# Patient Record
Sex: FEMALE | Race: WHITE | NOT HISPANIC OR LATINO | URBAN - METROPOLITAN AREA
[De-identification: names, ages, dates, MRNs, and addresses within clinical notes are randomized per-mention and may not be internally consistent; named-entity substitution may affect disease eponyms.]

---

## 2018-09-24 ENCOUNTER — OFFICE (OUTPATIENT)
Dept: URBAN - METROPOLITAN AREA CLINIC 11 | Facility: CLINIC | Age: 68
End: 2018-09-24

## 2018-09-24 VITALS
WEIGHT: 140 LBS | DIASTOLIC BLOOD PRESSURE: 67 MMHG | HEART RATE: 63 BPM | SYSTOLIC BLOOD PRESSURE: 122 MMHG | HEIGHT: 65 IN

## 2018-09-24 DIAGNOSIS — K21.9 GASTRO-ESOPHAGEAL REFLUX DISEASE WITHOUT ESOPHAGITIS: ICD-10-CM

## 2018-09-24 DIAGNOSIS — K59.00 CONSTIPATION, UNSPECIFIED: ICD-10-CM

## 2018-09-24 DIAGNOSIS — D3A.00 BENIGN CARCINOID TUMOR OF UNSPECIFIED SITE: ICD-10-CM

## 2018-09-24 DIAGNOSIS — R14.0 ABDOMINAL DISTENSION (GASEOUS): ICD-10-CM

## 2018-09-24 DIAGNOSIS — I48.91 UNSPECIFIED ATRIAL FIBRILLATION: ICD-10-CM

## 2018-09-24 DIAGNOSIS — Z79.01 LONG TERM (CURRENT) USE OF ANTICOAGULANTS: ICD-10-CM

## 2018-09-24 DIAGNOSIS — D38.1 NEOPLASM OF UNCERTAIN BEHAVIOR OF TRACHEA, BRONCHUS AND LUNG: ICD-10-CM

## 2018-09-24 PROCEDURE — 99204 OFFICE O/P NEW MOD 45 MIN: CPT | Performed by: INTERNAL MEDICINE

## 2018-09-24 RX ORDER — SODIUM PICOSULFATE, MAGNESIUM OXIDE, AND ANHYDROUS CITRIC ACID 10; 3.5; 12 MG/160ML; G/160ML; G/160ML
LIQUID ORAL
Qty: 1 | Refills: 0 | Status: COMPLETED
Start: 2018-09-24 | End: 2018-11-05

## 2018-09-24 NOTE — SERVICENOTES
We will go ahead and schedule the patient for colonoscopy and because of her history of carcinoid and mild upper abdominal symptoms I do think doing EGD at the same time would be reasonable, especially as she will be off of anticoagulation at that time and stable from cardiac standpoint.  We will need cardiac clearance prior to proceeding with the procedure. I did have her evaluated by one of our lead CRNAs regarding candidacy for procedure in the surgery center given her cardiac history and history of lung resection and it appears that she would be a candidate once we have cardiac clearance.  The patient does apparently get surveillance imaging by her oncology team in [place] and states she is due for her next MRI of the liver/pancreas in the next few weeks.  She apparently had a negative octreotide scan recently as well. Her constipation appears to be improved.  Her ER visit few weeks ago could be related to either new medication or gastroenteritis.  I did recommend she start taking fiber supplementation and stool softeners or MiraLax as needed especially when she travels.  She should otherwise notify us if she has any new or worsening symptoms in the meantime.

## 2018-09-24 NOTE — SERVICEHPINOTES
Ms. Bhatti is a 68-year-old female here for evaluation for colonoscopy. The patient states that her last colonoscopy was around nine or 10 years ago with no polyps removed that she is aware of. She denies any first-degree family history of colon cancer colon polyps with does have a family history of colon cancer in her grandfather. 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 She then did well up until 2009 when she was found to have recurrence of some disease in the liver and so underwent partial resection in [facility] by Dr. Yasir Shine. She apparently has done well since then up until recently when she underwent another bronchoscopy which revealed a small nodule at the stump of her prior left resection which was found to be carcinoid and so underwent ablation. During this time she also struggles with new onset atrial fibrillation and has undergone cardio ablation 3 times. She is currently in sinus rhythm. She is also on blood thinners. She denies any issues with chest pain or significant shortness of breath. She had recent octreotide scan which was reportedly negative. She denies any issues with upper abdominal pain, nausea, or vomiting. She does have some occasional reflux for which she takes over-the-counter medications as needed. She also has some occasional bloating. The patient did have to go to the hospital because of some acute abdominal pain in August 2018 at which time she had CT scan with contrast which revealed normal pancreas. There was gas and fecal material noted throughout the small bowel but was otherwise negative. Patient was given a laxative which she states worked very well for her and since then she has not had any issues. She denies any return of constipation or other changes in medications. Outside of being put on sotalol.

## 2018-11-05 ENCOUNTER — AMBULATORY SURGICAL CENTER (OUTPATIENT)
Dept: URBAN - METROPOLITAN AREA SURGERY 3 | Facility: SURGERY | Age: 68
End: 2018-11-05
Payer: COMMERCIAL

## 2018-11-05 ENCOUNTER — OFFICE (OUTPATIENT)
Dept: URBAN - METROPOLITAN AREA PATHOLOGY 22 | Facility: PATHOLOGY | Age: 68
End: 2018-11-05
Payer: COMMERCIAL

## 2018-11-05 ENCOUNTER — AMBULATORY SURGICAL CENTER (OUTPATIENT)
Dept: URBAN - METROPOLITAN AREA SURGERY 3 | Facility: SURGERY | Age: 68
End: 2018-11-05

## 2018-11-05 VITALS
HEART RATE: 56 BPM | SYSTOLIC BLOOD PRESSURE: 109 MMHG | DIASTOLIC BLOOD PRESSURE: 48 MMHG | SYSTOLIC BLOOD PRESSURE: 103 MMHG | SYSTOLIC BLOOD PRESSURE: 150 MMHG | OXYGEN SATURATION: 96 % | RESPIRATION RATE: 17 BRPM | OXYGEN SATURATION: 97 % | HEIGHT: 65 IN | SYSTOLIC BLOOD PRESSURE: 108 MMHG | OXYGEN SATURATION: 93 % | DIASTOLIC BLOOD PRESSURE: 53 MMHG | DIASTOLIC BLOOD PRESSURE: 96 MMHG | SYSTOLIC BLOOD PRESSURE: 113 MMHG | TEMPERATURE: 97.1 F | DIASTOLIC BLOOD PRESSURE: 96 MMHG | HEIGHT: 65 IN | RESPIRATION RATE: 16 BRPM | HEART RATE: 58 BPM | RESPIRATION RATE: 17 BRPM | RESPIRATION RATE: 18 BRPM | RESPIRATION RATE: 18 BRPM | TEMPERATURE: 97.1 F | DIASTOLIC BLOOD PRESSURE: 56 MMHG | DIASTOLIC BLOOD PRESSURE: 48 MMHG | HEART RATE: 58 BPM | WEIGHT: 135 LBS | SYSTOLIC BLOOD PRESSURE: 108 MMHG | HEART RATE: 68 BPM | OXYGEN SATURATION: 96 % | HEART RATE: 56 BPM | TEMPERATURE: 97.6 F | HEART RATE: 68 BPM | DIASTOLIC BLOOD PRESSURE: 51 MMHG | TEMPERATURE: 97.6 F | OXYGEN SATURATION: 97 % | RESPIRATION RATE: 20 BRPM | OXYGEN SATURATION: 93 % | SYSTOLIC BLOOD PRESSURE: 113 MMHG | RESPIRATION RATE: 16 BRPM | WEIGHT: 135 LBS | DIASTOLIC BLOOD PRESSURE: 56 MMHG | RESPIRATION RATE: 20 BRPM | SYSTOLIC BLOOD PRESSURE: 150 MMHG | SYSTOLIC BLOOD PRESSURE: 109 MMHG | DIASTOLIC BLOOD PRESSURE: 53 MMHG | SYSTOLIC BLOOD PRESSURE: 103 MMHG | DIASTOLIC BLOOD PRESSURE: 51 MMHG

## 2018-11-05 DIAGNOSIS — R14.0 ABDOMINAL DISTENSION (GASEOUS): ICD-10-CM

## 2018-11-05 DIAGNOSIS — K31.89 OTHER DISEASES OF STOMACH AND DUODENUM: ICD-10-CM

## 2018-11-05 DIAGNOSIS — B37.81 CANDIDAL ESOPHAGITIS: ICD-10-CM

## 2018-11-05 DIAGNOSIS — D12.2 BENIGN NEOPLASM OF ASCENDING COLON: ICD-10-CM

## 2018-11-05 DIAGNOSIS — Z12.11 ENCOUNTER FOR SCREENING FOR MALIGNANT NEOPLASM OF COLON: ICD-10-CM

## 2018-11-05 DIAGNOSIS — K63.5 POLYP OF COLON: ICD-10-CM

## 2018-11-05 DIAGNOSIS — K21.9 GASTRO-ESOPHAGEAL REFLUX DISEASE WITHOUT ESOPHAGITIS: ICD-10-CM

## 2018-11-05 DIAGNOSIS — K22.2 ESOPHAGEAL OBSTRUCTION: ICD-10-CM

## 2018-11-05 DIAGNOSIS — K64.1 SECOND DEGREE HEMORRHOIDS: ICD-10-CM

## 2018-11-05 DIAGNOSIS — K57.30 DIVERTICULOSIS OF LARGE INTESTINE WITHOUT PERFORATION OR ABS: ICD-10-CM

## 2018-11-05 DIAGNOSIS — K44.9 DIAPHRAGMATIC HERNIA WITHOUT OBSTRUCTION OR GANGRENE: ICD-10-CM

## 2018-11-05 PROBLEM — D12.4 BENIGN NEOPLASM OF DESCENDING COLON: Status: ACTIVE | Noted: 2018-11-05

## 2018-11-05 PROCEDURE — 45385 COLONOSCOPY W/LESION REMOVAL: CPT | Mod: PT | Performed by: INTERNAL MEDICINE

## 2018-11-05 PROCEDURE — 45380 COLONOSCOPY AND BIOPSY: CPT | Mod: 59,PT | Performed by: INTERNAL MEDICINE

## 2018-11-05 PROCEDURE — 45380 COLONOSCOPY AND BIOPSY: CPT | Mod: PT,59 | Performed by: INTERNAL MEDICINE

## 2018-11-05 PROCEDURE — G8907 PT DOC NO EVENTS ON DISCHARG: HCPCS | Performed by: INTERNAL MEDICINE

## 2018-11-05 PROCEDURE — 43239 EGD BIOPSY SINGLE/MULTIPLE: CPT | Mod: 51 | Performed by: INTERNAL MEDICINE

## 2018-11-05 PROCEDURE — G8918 PT W/O PREOP ORDER IV AB PRO: HCPCS | Performed by: INTERNAL MEDICINE

## 2018-11-05 PROCEDURE — 88313 SPECIAL STAINS GROUP 2: CPT | Performed by: INTERNAL MEDICINE

## 2018-11-05 PROCEDURE — 88342 IMHCHEM/IMCYTCHM 1ST ANTB: CPT | Performed by: INTERNAL MEDICINE

## 2018-11-05 PROCEDURE — 88305 TISSUE EXAM BY PATHOLOGIST: CPT | Performed by: INTERNAL MEDICINE

## 2018-11-05 PROCEDURE — 45380 COLONOSCOPY AND BIOPSY: CPT | Mod: 59 | Performed by: INTERNAL MEDICINE

## 2018-11-05 RX ORDER — FLUCONAZOLE 100 MG/1
TABLET ORAL
Qty: 15 | Refills: 0 | Status: COMPLETED
Start: 2018-11-05 | End: 2020-11-16

## 2018-11-05 NOTE — SERVICEHPINOTES
Ms. Bhatti is a 68-year-old female here for evaluation for colonoscopy. The patient states that her last colonoscopy was around nine or 10 years ago with no polyps removed that she is aware of. She denies any first-degree family history of colon cancer colon polyps with does have a family history of colon cancer in her grandfather. 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 She then did well up until 2009 when she was found to have recurrence of some disease in the liver and so underwent partial resection in [facility] by Dr. Yasir Shine. She apparently has done well since then up until recently when she underwent another bronchoscopy which revealed a small nodule at the stump of her prior left resection which was found to be carcinoid and so underwent ablation. During this time she also struggles with new onset atrial fibrillation and has undergone cardio ablation 3 times. She is currently in sinus rhythm. She is also on blood thinners. She denies any issues with chest pain or significant shortness of breath. She had recent octreotide scan which was reportedly negative. She denies any issues with upper abdominal pain, nausea, or vomiting. She does have some occasional reflux for which she takes over-the-counter medications as needed. She also has some occasional bloating. The patient did have to go to the hospital because of some acute abdominal pain in August 2018 at which time she had CT scan with contrast which revealed normal pancreas. There was gas and fecal material noted throughout the small bowel but was otherwise negative. Patient was given a laxative which she states worked very well for her and since then she has not had any issues. She denies any return of constipation or other changes in medications. Outside of being put on sotalol.

## 2018-11-05 NOTE — SERVICENOTES
Discussed with her cardiologist, Dr. Jiménez.  The patient is to stay off of Eliquis until fluconazole completed as she is in sinus rhythm.  Due to possible QTc prolongation she recommends continue sotalol at current dose and pt is to come to her office on Wednesday for EKG.

## 2019-06-24 ENCOUNTER — OFFICE (OUTPATIENT)
Dept: URBAN - METROPOLITAN AREA CLINIC 11 | Facility: CLINIC | Age: 69
End: 2019-06-24

## 2019-06-24 VITALS
SYSTOLIC BLOOD PRESSURE: 138 MMHG | DIASTOLIC BLOOD PRESSURE: 81 MMHG | HEIGHT: 65 IN | HEART RATE: 65 BPM | WEIGHT: 146 LBS

## 2019-06-24 DIAGNOSIS — Z79.01 LONG TERM (CURRENT) USE OF ANTICOAGULANTS: ICD-10-CM

## 2019-06-24 DIAGNOSIS — R10.12 LEFT UPPER QUADRANT PAIN: ICD-10-CM

## 2019-06-24 DIAGNOSIS — I48.91 UNSPECIFIED ATRIAL FIBRILLATION: ICD-10-CM

## 2019-06-24 DIAGNOSIS — K21.9 GASTRO-ESOPHAGEAL REFLUX DISEASE WITHOUT ESOPHAGITIS: ICD-10-CM

## 2019-06-24 DIAGNOSIS — K59.00 CONSTIPATION, UNSPECIFIED: ICD-10-CM

## 2019-06-24 DIAGNOSIS — R14.0 ABDOMINAL DISTENSION (GASEOUS): ICD-10-CM

## 2019-06-24 DIAGNOSIS — D38.1 NEOPLASM OF UNCERTAIN BEHAVIOR OF TRACHEA, BRONCHUS AND LUNG: ICD-10-CM

## 2019-06-24 DIAGNOSIS — D3A.00 BENIGN CARCINOID TUMOR OF UNSPECIFIED SITE: ICD-10-CM

## 2019-06-24 LAB
CREATININE: 0.76 MG/DL (ref 0.57–1)
CREATININE: EGFR IF AFRICN AM: 93 ML/MIN/1.73 (ref 59–?)
CREATININE: EGFR IF NONAFRICN AM: 81 ML/MIN/1.73 (ref 59–?)

## 2019-06-24 PROCEDURE — 99214 OFFICE O/P EST MOD 30 MIN: CPT | Performed by: INTERNAL MEDICINE

## 2019-06-24 RX ORDER — DICYCLOMINE HYDROCHLORIDE 20 MG/1
TABLET ORAL
Qty: 90 | Refills: 6 | Status: CANCELLED
Start: 2018-10-15 | End: 2021-03-18

## 2019-06-24 NOTE — SERVICENOTES
Given the patient's new onset of symptoms I do think abdominal imaging is reasonable.  This will allow us to evaluate for any neoplasm, inflammation, or significantly large stool burden.  In the meantime I do want to be more aggressive in treating her constipation so I did recommend that she take fiber on a daily basis as well as MiraLax daily.  We did give her some samples of Trulance to take if MiraLax does not work.  I did offer for her to take Trulance up from but she wanted to try MiraLax first.  If her symptoms do not improve but have improvement of bowel habits and we can also consider trial of a PPI.  In the meantime I will go ahead and refill her Bentyl as well. She should otherwise notify us if symptoms worsen or do not improve with improvement of bowel habits.

## 2019-06-24 NOTE — SERVICEHPINOTES
Ms. Bhatti is a 69-year-old female here for evaluation of constipation and new LUQ pain. 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 She then did well up until 2009 when she was found to have recurrence of some disease in the liver and so underwent partial resection in [facility] by Dr. Yasir Shine. She apparently has done well since then up until recently when she underwent another bronchoscopy which revealed a small nodule at the stump of her prior left resection which was found to be carcinoid and so underwent ablation. During this time she also struggled with new onset atrial fibrillation and has undergone cardiac ablation 3 times. She is also on blood thinners. The patient did have to go to the hospital because of some acute abdominal pain in August 2018 at which time she had CT scan with contrast which revealed normal pancreas. There was gas and fecal material noted throughout the small bowel but was otherwise negative. The patient was given a laxative which she states worked very well for her.Interim History:Psychiatric initially saw me in September 2018 and at that time we had undergo EGD and colonoscopy. EGD did reveal a diminutive Schatzki ring as well as some grade to Candida esophagitis and a 4 cm medium hiatal hernia. There was some patchy gastric erythema with biopsies only with benign reactive gastropathy. Esophageal biopsies were with some mild reactive changes as well. Colonoscopy revealed some mild diverticulosis but was otherwise normal to the terminal ileum. There was an adenomatous polyp removed in her next colonoscopy is due in 2023. She was treated with fluconazole for Candida esophagitis. She now comes in today stating that overall she has been doing relatively well. She does have some occasional abdominal pain which is improved with dicyclomine. She has been having some more constipation recently but denies taking anything for it. She will occasionally take a stool softener. Her bowel movements have been smaller. What really by her here was more pain under her left rib cage. She states that this started a few weeks ago and will come go on its own. She does not know of any relation to bowel habits or food that she is aware of. She will occasionally get a similar pain on the right side of the ribcage but it is mainly on the left. It does not radiate. Nothing really makes it better or worse. Though she does have some mild reflux she does not take anything on a regular basis for this.

## 2019-07-03 ENCOUNTER — OFFICE (OUTPATIENT)
Dept: URBAN - METROPOLITAN AREA CLINIC 22 | Facility: CLINIC | Age: 69
End: 2019-07-03

## 2019-07-03 DIAGNOSIS — R10.12 LEFT UPPER QUADRANT PAIN: ICD-10-CM

## 2019-07-03 DIAGNOSIS — R14.0 ABDOMINAL DISTENSION (GASEOUS): ICD-10-CM

## 2019-07-03 PROCEDURE — 74177 CT ABD & PELVIS W/CONTRAST: CPT | Mod: TC | Performed by: INTERNAL MEDICINE

## 2019-09-30 ENCOUNTER — OFFICE (OUTPATIENT)
Dept: URBAN - METROPOLITAN AREA CLINIC 11 | Facility: CLINIC | Age: 69
End: 2019-09-30

## 2019-09-30 VITALS
SYSTOLIC BLOOD PRESSURE: 131 MMHG | HEIGHT: 65 IN | HEART RATE: 58 BPM | WEIGHT: 147 LBS | DIASTOLIC BLOOD PRESSURE: 86 MMHG

## 2019-09-30 DIAGNOSIS — K59.00 CONSTIPATION, UNSPECIFIED: ICD-10-CM

## 2019-09-30 DIAGNOSIS — I48.91 UNSPECIFIED ATRIAL FIBRILLATION: ICD-10-CM

## 2019-09-30 DIAGNOSIS — D38.1 NEOPLASM OF UNCERTAIN BEHAVIOR OF TRACHEA, BRONCHUS AND LUNG: ICD-10-CM

## 2019-09-30 DIAGNOSIS — Z79.01 LONG TERM (CURRENT) USE OF ANTICOAGULANTS: ICD-10-CM

## 2019-09-30 DIAGNOSIS — K21.9 GASTRO-ESOPHAGEAL REFLUX DISEASE WITHOUT ESOPHAGITIS: ICD-10-CM

## 2019-09-30 DIAGNOSIS — D3A.00 BENIGN CARCINOID TUMOR OF UNSPECIFIED SITE: ICD-10-CM

## 2019-09-30 DIAGNOSIS — R14.0 ABDOMINAL DISTENSION (GASEOUS): ICD-10-CM

## 2019-09-30 PROCEDURE — 99213 OFFICE O/P EST LOW 20 MIN: CPT | Performed by: INTERNAL MEDICINE

## 2019-09-30 NOTE — SERVICENOTES
Overall the patient does appear to be doing better.  Her bowel habits have improved with fiber and MiraLax.  I did offer a trial of Linzess or Amitza but the patient states that she was not very interested at this time.  While her symptoms at night could be related to her “bronchitis” it could also be related to sinuses or nighttime reflux issue so I did recommend that she be sure not to eat within 3 hours of going to bed, try to  sleep on her left side, and may take over-the-counter Pepcid.  She was instructed to notify us if she would like to try intake Prilosec or Nexium as well.

## 2019-09-30 NOTE — SERVICEHPINOTES
Ms. Bhatti is a 69-year-old female here for follow up. 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 She then did well up until 2009 when she was found to have recurrence of some disease in the liver and so underwent partial resection in [facility] by Dr. Yasir Shine. She apparently has done well since then up until recently when she underwent another bronchoscopy which revealed a small nodule at the stump of her prior left resection which was found to be carcinoid and so underwent ablation. During this time she also struggled with new onset atrial fibrillation and has undergone cardiac ablation 3 times. She is also on blood thinners. The patient did have to go to the hospital because of some acute abdominal pain in August 2018 at which time she had CT scan with contrast which revealed normal pancreas. There was gas and fecal material noted throughout the small bowel but was otherwise negative. The patient was given a laxative which she states worked very well for her. She initially saw me in September 2018 and at that time we had undergo EGD and colonoscopy. EGD did reveal a diminutive Schatzki ring as well as some grade to Candida esophagitis and a 4 cm medium hiatal hernia. There was some patchy gastric erythema with biopsies only with benign reactive gastropathy. Esophageal biopsies were with some mild reactive changes as well. Colonoscopy revealed some mild diverticulosis but was otherwise normal to the terminal ileum. There was an adenomatous polyp removed in her next colonoscopy is due in 2023. She was treated with fluconazole for Candida esophagitis.Interim History:Arash I last saw the patient in June 2019 she was having some issues with some left upper quadrant discomfort. We did order CT scan which was overall normal except for a small 1.4 centimeter liver lesion stable since 2017. We did also give her a trial of Trulance to help with constipation as she states her pain was better after having bowel movements. She took Trulance for week but states that she really did not notice any change. She has since been taking fiber and MiraLax on a more regular basis and states that she now has bowel movements on a daily basis which have helped. She also has been taking dicyclomine as needed for pain which she states also helps significantly. She states that she will rarely get reflux. She now notes that her pain is more in the left flank area and only happens occasionally. She denies any urinary problems or blood in her urine. She states that she does have some occasional episodes of coughing at night which she attributes to her carcinoid but does note that is usually on the right side when it happens. She does not take anything for sinus medication or any PPI.

## 2020-02-03 ENCOUNTER — INPATIENT HOSPITAL (OUTPATIENT)
Dept: URBAN - METROPOLITAN AREA HOSPITAL 130 | Facility: HOSPITAL | Age: 70
End: 2020-02-03

## 2020-02-03 DIAGNOSIS — R10.11 RIGHT UPPER QUADRANT PAIN: ICD-10-CM

## 2020-02-03 DIAGNOSIS — I82.0 BUDD-CHIARI SYNDROME: ICD-10-CM

## 2020-02-03 PROCEDURE — 99222 1ST HOSP IP/OBS MODERATE 55: CPT | Performed by: INTERNAL MEDICINE

## 2020-02-04 ENCOUNTER — INPATIENT HOSPITAL (OUTPATIENT)
Dept: URBAN - METROPOLITAN AREA HOSPITAL 130 | Facility: HOSPITAL | Age: 70
End: 2020-02-04

## 2020-02-04 DIAGNOSIS — I82.0 BUDD-CHIARI SYNDROME: ICD-10-CM

## 2020-02-04 DIAGNOSIS — R10.11 RIGHT UPPER QUADRANT PAIN: ICD-10-CM

## 2020-02-04 PROCEDURE — 99231 SBSQ HOSP IP/OBS SF/LOW 25: CPT | Performed by: INTERNAL MEDICINE

## 2020-05-18 ENCOUNTER — OFFICE (OUTPATIENT)
Dept: URBAN - METROPOLITAN AREA CLINIC 11 | Facility: CLINIC | Age: 70
End: 2020-05-18

## 2020-05-18 VITALS
DIASTOLIC BLOOD PRESSURE: 68 MMHG | SYSTOLIC BLOOD PRESSURE: 123 MMHG | WEIGHT: 148 LBS | HEART RATE: 63 BPM | HEIGHT: 65 IN

## 2020-05-18 DIAGNOSIS — K21.9 GASTRO-ESOPHAGEAL REFLUX DISEASE WITHOUT ESOPHAGITIS: ICD-10-CM

## 2020-05-18 DIAGNOSIS — D38.1 NEOPLASM OF UNCERTAIN BEHAVIOR OF TRACHEA, BRONCHUS AND LUNG: ICD-10-CM

## 2020-05-18 DIAGNOSIS — D3A.00 BENIGN CARCINOID TUMOR OF UNSPECIFIED SITE: ICD-10-CM

## 2020-05-18 DIAGNOSIS — K59.00 CONSTIPATION, UNSPECIFIED: ICD-10-CM

## 2020-05-18 DIAGNOSIS — R10.12 LEFT UPPER QUADRANT PAIN: ICD-10-CM

## 2020-05-18 PROCEDURE — 99213 OFFICE O/P EST LOW 20 MIN: CPT | Performed by: INTERNAL MEDICINE

## 2020-05-18 NOTE — SERVICENOTES
The patient was seen along with Karina Parmar NP.  I have evaluated the patient, discussed with NP, and agree with the above history, impression, and plan. She is doing well.  No further pain.  GERD and constipation better.  Abdominal exam benign.-WENDY

## 2020-05-18 NOTE — SERVICEHPINOTES
Ms. Bhatti is a 70-year-old female here for a follow-up for constipation, left upper quadrant pain, and GERD, the patient had a CT scan performed 07/03/2019 for her left upper quadrant pain which is unremarkable. Overall, the patient has been doing very well. The patient stated that she began to take been a fiber for her constipation which did seem to relieved her left upper quadrant pain. The patient states that she has regular formed bowel movements daily and is not a longer constipated. The patient also reports that her GERD symptoms have since resolved and she is no longer taking a PPI. The patient stated that she would be willing to take an H2 blocker for breakthrough reflux, but she states that she controls her reflux with her diet fairly well. The patient does report taking Bentyl for occasional abdominal cramping, but states that she will read overtly have to do this. The patient denies any abdominal pain, nausea, vomiting, melena, hematochezia, or unintentional weight loss. The patient's weight has remained stable since her last visit. The patient is due for colonoscopy recall in 2023.

## 2020-11-16 ENCOUNTER — OFFICE (OUTPATIENT)
Dept: URBAN - METROPOLITAN AREA CLINIC 11 | Facility: CLINIC | Age: 70
End: 2020-11-16

## 2020-11-16 VITALS
HEART RATE: 67 BPM | SYSTOLIC BLOOD PRESSURE: 120 MMHG | OXYGEN SATURATION: 94 % | WEIGHT: 152 LBS | DIASTOLIC BLOOD PRESSURE: 60 MMHG | HEIGHT: 65 IN

## 2020-11-16 DIAGNOSIS — D3A.00 BENIGN CARCINOID TUMOR OF UNSPECIFIED SITE: ICD-10-CM

## 2020-11-16 DIAGNOSIS — R14.0 ABDOMINAL DISTENSION (GASEOUS): ICD-10-CM

## 2020-11-16 DIAGNOSIS — K59.00 CONSTIPATION, UNSPECIFIED: ICD-10-CM

## 2020-11-16 DIAGNOSIS — R10.12 LEFT UPPER QUADRANT PAIN: ICD-10-CM

## 2020-11-16 DIAGNOSIS — D38.1 NEOPLASM OF UNCERTAIN BEHAVIOR OF TRACHEA, BRONCHUS AND LUNG: ICD-10-CM

## 2020-11-16 DIAGNOSIS — K21.9 GASTRO-ESOPHAGEAL REFLUX DISEASE WITHOUT ESOPHAGITIS: ICD-10-CM

## 2020-11-16 PROCEDURE — 99213 OFFICE O/P EST LOW 20 MIN: CPT | Performed by: INTERNAL MEDICINE

## 2021-11-15 ENCOUNTER — OFFICE (OUTPATIENT)
Dept: URBAN - METROPOLITAN AREA CLINIC 11 | Facility: CLINIC | Age: 71
End: 2021-11-15

## 2021-11-15 VITALS
HEIGHT: 65 IN | OXYGEN SATURATION: 100 % | HEART RATE: 58 BPM | WEIGHT: 135 LBS | DIASTOLIC BLOOD PRESSURE: 72 MMHG | SYSTOLIC BLOOD PRESSURE: 121 MMHG

## 2021-11-15 DIAGNOSIS — D3A.00 BENIGN CARCINOID TUMOR OF UNSPECIFIED SITE: ICD-10-CM

## 2021-11-15 DIAGNOSIS — D38.1 NEOPLASM OF UNCERTAIN BEHAVIOR OF TRACHEA, BRONCHUS AND LUNG: ICD-10-CM

## 2021-11-15 DIAGNOSIS — R10.12 LEFT UPPER QUADRANT PAIN: ICD-10-CM

## 2021-11-15 DIAGNOSIS — K59.00 CONSTIPATION, UNSPECIFIED: ICD-10-CM

## 2021-11-15 DIAGNOSIS — K21.9 GASTRO-ESOPHAGEAL REFLUX DISEASE WITHOUT ESOPHAGITIS: ICD-10-CM

## 2021-11-15 PROCEDURE — 99213 OFFICE O/P EST LOW 20 MIN: CPT

## 2021-11-15 NOTE — SERVICEHPINOTES
Ms. Bhatti is a 71 yr old female who presents today for follow up for constipation, abdominal pain and Gerd. She states that her constipation has resolved. She now will have 2-3 formed BMs a day with no straining or rectal bleeding. She also has noted that she has some occasional twinges of her left upper quadrant discomfort about once a week. This is well controlled with dicyclomine. She also knows that this is sometimes worse with spicy food. Her reflux is under very good control with dietary modification. She has lost 15 pounds since we last saw her in Nov. 2020. She admits to changing her diet and exercising more. She has a history of A. Fib and is currently taking medication for her irregular heart rhythm. 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 She was started on Octreotide in April 2021 for her liver lesions and receives injections every 4 weeks. She currently sees Dr. Levi for her oncology management. She was informed that her lesions are shrinking. She does have a history of a carcinoid tumor of the lung with history of left lung resection in 1995 and an ablation of tumor and bronchial stump in 2018 She had a colonoscopy in 2018 which revealed mild diverticulosis of the sigmoid colon, Normal mucosa in the terminal ileum and whole colon, Polyp  in the ascending colon, Polyp in the descending colon and Grade/Stage II internal hemorrhoids. Her next colonoscopy is schedule for 2023. 
br
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 She then did well up until 2009 when she was found to have recurrence of some disease in the liver and so underwent partial resection in [place] by Dr. Yasir Shine. She apparently has done well since then up until recently when she underwent another bronchoscopy which revealed a small nodule at the stump of her prior left resection which was found to be carcinoid and so underwent ablation. During this time she also struggled with new onset atrial fibrillation and has undergone cardiac ablation. The patient did have to go to the hospital because of some acute abdominal pain in August 2018 at which time she had CT scan with contrast which revealed normal pancreas. There was gas and fecal material noted throughout the small bowel but was otherwise negative. The patient was given a laxative which she states worked very well for her. She initially saw me in September 2018 and at that time we had undergo EGD and colonoscopy. EGD did reveal a diminutive Schatzki ring as well as some grade to Candida esophagitis and a 4 cm medium hiatal hernia. There was some patchy gastric erythema with biopsies only with benign reactive gastropathy. Esophageal biopsies were with some mild reactive changes as well. Colonoscopy revealed some mild diverticulosis but was otherwise normal to the terminal ileum. There was an adenomatous polyp removed in her next colonoscopy is due in 2023. She was treated with fluconazole for Candida esophagitis. In June 2019 she was having some issues with some left upper quadrant discomfort. We did order CT scan which was overall normal except for a small 1.4 centimeter liver lesion stable since 2017. We did also give her a trial of Trulance to help with constipation as she states her pain was better after having bowel movements. She took Trulance for a week but states that she really did not notice any change.   ?

## 2021-11-15 NOTE — SERVICENOTES
MD Addendum: The patient was seen along with Lisa Peñaloza NP.  I have evaluated the patient, discussed with NP, and agree with the above documented findings, impression, and plan of care.-WENDY

## 2022-11-21 ENCOUNTER — OFFICE (OUTPATIENT)
Dept: URBAN - METROPOLITAN AREA CLINIC 11 | Facility: CLINIC | Age: 72
End: 2022-11-21

## 2022-11-21 VITALS
WEIGHT: 130 LBS | SYSTOLIC BLOOD PRESSURE: 147 MMHG | OXYGEN SATURATION: 97 % | DIASTOLIC BLOOD PRESSURE: 79 MMHG | HEART RATE: 69 BPM | HEIGHT: 65 IN

## 2022-11-21 DIAGNOSIS — R10.11 RIGHT UPPER QUADRANT PAIN: ICD-10-CM

## 2022-11-21 DIAGNOSIS — R14.0 ABDOMINAL DISTENSION (GASEOUS): ICD-10-CM

## 2022-11-21 DIAGNOSIS — D38.1 NEOPLASM OF UNCERTAIN BEHAVIOR OF TRACHEA, BRONCHUS AND LUNG: ICD-10-CM

## 2022-11-21 DIAGNOSIS — I48.91 UNSPECIFIED ATRIAL FIBRILLATION: ICD-10-CM

## 2022-11-21 DIAGNOSIS — K59.00 CONSTIPATION, UNSPECIFIED: ICD-10-CM

## 2022-11-21 DIAGNOSIS — D3A.00 BENIGN CARCINOID TUMOR OF UNSPECIFIED SITE: ICD-10-CM

## 2022-11-21 DIAGNOSIS — K58.9 IRRITABLE BOWEL SYNDROME WITHOUT DIARRHEA: ICD-10-CM

## 2022-11-21 PROCEDURE — 99214 OFFICE O/P EST MOD 30 MIN: CPT | Performed by: INTERNAL MEDICINE

## 2022-11-21 NOTE — SERVICEHPINOTES
Ms. Bhatti is a 72 yr old female who presents today for follow up. We last saw the patient in November 2021 she states that her constipation issues had basically resolved. She was having some occasional twinges of right side abdominal pain in because this was given antispasmodic which has worked well for her. She only gets this pain rarely and occasionally but could not know what exactly is causing the symptoms. She was started on octreotide in April 2021 due to liver lesions from her carcinoid and is followed by Oncology on a regular basis along with regular imaging. 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 She then did well up until 2009 when she was found to have recurrence of some disease in the liver and so underwent partial resection in [place] by Dr. Yasir Shine. She apparently has done well since then up until recently when she underwent another bronchoscopy which revealed a small nodule at the stump of her prior left resection which was found to be carcinoid and so underwent ablation. During this time she also struggled with new onset atrial fibrillation and has undergone cardiac ablation. The patient did have to go to the hospital because of some acute abdominal pain in August 2018 at which time she had CT scan with contrast which revealed normal pancreas. There was gas and fecal material noted throughout the small bowel but was otherwise negative. The patient was given a laxative which she states worked very well for her. She initially saw me in September 2018 and at that time we had undergo EGD and colonoscopy. EGD did reveal a diminutive Schatzki ring as well as some grade to Candida esophagitis and a 4 cm medium hiatal hernia. There was some patchy gastric erythema with biopsies only with benign reactive gastropathy. Esophageal biopsies were with some mild reactive changes as well. Colonoscopy revealed some mild diverticulosis but was otherwise normal to the terminal ileum. There was an adenomatous polyp removed in her next colonoscopy is due in 2023. She was treated with fluconazole for Candida esophagitis. In June 2019 she was having some issues with some left upper quadrant discomfort. We did order CT scan which was overall normal except for a small 1.4 centimeter liver lesion stable since 2017. We did also give her a trial of Trulance to help with constipation as she states her pain was better after having bowel movements. She took Trulance for a week but states that she really did not notice any change. ?

## 2022-11-21 NOTE — SERVICENOTES
Overall the patient is doing well. She denies any issues with any significant pain, fevers, chills, constipation, etc.  Her next colonoscopy is due next year.  She does have some occasional twinges of pain which are controlled very well with antispasmodic.  Of note, she has had a negative evaluation including EGD, colonoscopy, etc.  If symptoms do worsen then I do think repeat imaging may be in order given her history of carcinoid, however the patient has been having imaging on a regular basis with her oncologist.  We can also consider EGD as well.

## 2023-12-04 ENCOUNTER — OFFICE (OUTPATIENT)
Dept: URBAN - METROPOLITAN AREA CLINIC 11 | Facility: CLINIC | Age: 73
End: 2023-12-04

## 2023-12-04 VITALS — HEIGHT: 65 IN

## 2023-12-04 DIAGNOSIS — Z86.010 PERSONAL HISTORY OF COLONIC POLYPS: ICD-10-CM

## 2023-12-04 DIAGNOSIS — K21.9 GASTRO-ESOPHAGEAL REFLUX DISEASE WITHOUT ESOPHAGITIS: ICD-10-CM

## 2023-12-04 DIAGNOSIS — Z79.01 LONG TERM (CURRENT) USE OF ANTICOAGULANTS: ICD-10-CM

## 2023-12-04 DIAGNOSIS — D38.1 NEOPLASM OF UNCERTAIN BEHAVIOR OF TRACHEA, BRONCHUS AND LUNG: ICD-10-CM

## 2023-12-04 DIAGNOSIS — K58.9 IRRITABLE BOWEL SYNDROME WITHOUT DIARRHEA: ICD-10-CM

## 2023-12-04 PROCEDURE — 99214 OFFICE O/P EST MOD 30 MIN: CPT | Performed by: INTERNAL MEDICINE

## 2023-12-04 RX ORDER — POLYETHYLENE GLYCOL 3350, SODIUM SULFATE, SODIUM CHLORIDE, POTASSIUM CHLORIDE, ASCORBIC ACID, SODIUM ASCORBATE 140-9-5.2G
KIT ORAL
Qty: 1 | Refills: 0 | Status: ACTIVE
Start: 2023-12-04

## 2025-08-12 ENCOUNTER — OFFICE (OUTPATIENT)
Dept: URBAN - METROPOLITAN AREA CLINIC 11 | Facility: CLINIC | Age: 75
End: 2025-08-12
Payer: MEDICARE

## 2025-08-12 VITALS
DIASTOLIC BLOOD PRESSURE: 66 MMHG | SYSTOLIC BLOOD PRESSURE: 120 MMHG | HEART RATE: 66 BPM | WEIGHT: 114 LBS | HEIGHT: 65 IN | OXYGEN SATURATION: 99 %

## 2025-08-12 DIAGNOSIS — D3A.00 BENIGN CARCINOID TUMOR OF UNSPECIFIED SITE: ICD-10-CM

## 2025-08-12 DIAGNOSIS — Z79.01 LONG TERM (CURRENT) USE OF ANTICOAGULANTS: ICD-10-CM

## 2025-08-12 DIAGNOSIS — Z86.0100 PERSONAL HISTORY OF COLON POLYPS, UNSPECIFIED: ICD-10-CM

## 2025-08-12 DIAGNOSIS — R85.89 OTHER ABNORMAL FINDINGS IN SPECIMENS FROM DIGESTIVE ORGANS A: ICD-10-CM

## 2025-08-12 PROCEDURE — 99214 OFFICE O/P EST MOD 30 MIN: CPT | Performed by: INTERNAL MEDICINE
